# Patient Record
Sex: FEMALE | Race: WHITE
[De-identification: names, ages, dates, MRNs, and addresses within clinical notes are randomized per-mention and may not be internally consistent; named-entity substitution may affect disease eponyms.]

---
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
## 2019-02-11 ENCOUNTER — HOSPITAL ENCOUNTER (INPATIENT)
Age: 19
LOS: 4 days | Discharge: HOME | DRG: 540 | End: 2019-02-15
Payer: MEDICAID

## 2019-02-11 VITALS — BODY MASS INDEX: 33.9 KG/M2

## 2019-02-11 DIAGNOSIS — Z3A.40: ICD-10-CM

## 2019-02-11 DIAGNOSIS — O45.93: ICD-10-CM

## 2019-02-11 DIAGNOSIS — Z87.891: ICD-10-CM

## 2019-02-11 LAB
ALANINE AMINOTRANSFER ALT/SGPT: 18 U/L (ref 13–56)
APTT PPP: 26.6 SECONDS (ref 24.1–36.2)
AST(SGOT): 18 U/L (ref 15–37)
DEPRECATED RDW RBC: 43 FL (ref 35.1–43.9)
ERYTHROCYTE [DISTWIDTH] IN BLOOD: 13 % (ref 11.6–14.6)
EST GLOM FILT RATE - AFR AMER: 148 ML/MIN (ref 60–?)
ESTIMATED CREATININE CLEARANCE: 118.39 ML/MIN
HCT VFR BLD AUTO: 33.5 % (ref 37–47)
HEMOGLOBIN: 11.1 G/DL (ref 12–15)
HGB BLD-MCNC: 11.1 G/DL (ref 12–15)
MCV RBC: 91.5 FL (ref 81–99)
MEAN CORP HGB CONC: 33.1 G/GL (ref 32–36)
MEAN PLATELET VOL.: 10.6 FL (ref 6.2–12)
PLATELET # BLD: 267 K/MM3 (ref 150–450)
PLATELET COUNT: 267 K/MM3 (ref 150–450)
PROTHROMBIN TIME (PROTIME)PT.: 12.2 SECONDS (ref 11.7–14.9)
RBC # BLD AUTO: 3.66 M/MM3 (ref 4.2–5.4)
RBC DISTRIBUTION WIDTH CV: 13 % (ref 11.6–14.6)
RBC DISTRIBUTION WIDTH SD: 43 FL (ref 35.1–43.9)
SCAN INDICATED ON CBC? Y/N: NO
URATE SERPL-MCNC: 4.9 MG/DL (ref 2.6–6)
WBC # BLD AUTO: 9.8 K/MM3 (ref 4.4–11)
WHITE BLOOD COUNT: 9.8 K/MM3 (ref 4.4–11)

## 2019-02-11 PROCEDURE — 59050 FETAL MONITOR W/REPORT: CPT

## 2019-02-11 PROCEDURE — 84450 TRANSFERASE (AST) (SGOT): CPT

## 2019-02-11 PROCEDURE — 84460 ALANINE AMINO (ALT) (SGPT): CPT

## 2019-02-11 PROCEDURE — 59025 FETAL NON-STRESS TEST: CPT

## 2019-02-11 PROCEDURE — 86850 RBC ANTIBODY SCREEN: CPT

## 2019-02-11 PROCEDURE — 88307 TISSUE EXAM BY PATHOLOGIST: CPT

## 2019-02-11 PROCEDURE — 85730 THROMBOPLASTIN TIME PARTIAL: CPT

## 2019-02-11 PROCEDURE — 82565 ASSAY OF CREATININE: CPT

## 2019-02-11 PROCEDURE — 85610 PROTHROMBIN TIME: CPT

## 2019-02-11 PROCEDURE — 86900 BLOOD TYPING SEROLOGIC ABO: CPT

## 2019-02-11 PROCEDURE — G0378 HOSPITAL OBSERVATION PER HR: HCPCS

## 2019-02-11 PROCEDURE — 84550 ASSAY OF BLOOD/URIC ACID: CPT

## 2019-02-11 PROCEDURE — 99218: CPT

## 2019-02-11 PROCEDURE — 85027 COMPLETE CBC AUTOMATED: CPT

## 2019-02-11 PROCEDURE — 36415 COLL VENOUS BLD VENIPUNCTURE: CPT

## 2019-02-11 PROCEDURE — A4216 STERILE WATER/SALINE, 10 ML: HCPCS

## 2019-02-12 VITALS
SYSTOLIC BLOOD PRESSURE: 127 MMHG | HEART RATE: 91 BPM | DIASTOLIC BLOOD PRESSURE: 67 MMHG | RESPIRATION RATE: 17 BRPM | OXYGEN SATURATION: 95 %

## 2019-02-12 VITALS
DIASTOLIC BLOOD PRESSURE: 72 MMHG | HEART RATE: 85 BPM | RESPIRATION RATE: 17 BRPM | SYSTOLIC BLOOD PRESSURE: 121 MMHG | OXYGEN SATURATION: 95 %

## 2019-02-12 VITALS
HEART RATE: 93 BPM | OXYGEN SATURATION: 95 % | RESPIRATION RATE: 16 BRPM | DIASTOLIC BLOOD PRESSURE: 72 MMHG | SYSTOLIC BLOOD PRESSURE: 92 MMHG

## 2019-02-12 VITALS
RESPIRATION RATE: 16 BRPM | DIASTOLIC BLOOD PRESSURE: 75 MMHG | TEMPERATURE: 98 F | SYSTOLIC BLOOD PRESSURE: 134 MMHG | OXYGEN SATURATION: 99 % | HEART RATE: 104 BPM

## 2019-02-12 VITALS
HEART RATE: 104 BPM | DIASTOLIC BLOOD PRESSURE: 81 MMHG | SYSTOLIC BLOOD PRESSURE: 126 MMHG | OXYGEN SATURATION: 99 % | RESPIRATION RATE: 15 BRPM

## 2019-02-12 VITALS
DIASTOLIC BLOOD PRESSURE: 72 MMHG | SYSTOLIC BLOOD PRESSURE: 112 MMHG | HEART RATE: 94 BPM | RESPIRATION RATE: 14 BRPM | OXYGEN SATURATION: 96 %

## 2019-02-12 VITALS — OXYGEN SATURATION: 96 % | RESPIRATION RATE: 18 BRPM | HEART RATE: 109 BPM

## 2019-02-12 VITALS
TEMPERATURE: 96.98 F | SYSTOLIC BLOOD PRESSURE: 107 MMHG | RESPIRATION RATE: 16 BRPM | HEART RATE: 97 BPM | DIASTOLIC BLOOD PRESSURE: 57 MMHG | OXYGEN SATURATION: 96 %

## 2019-02-12 VITALS
RESPIRATION RATE: 18 BRPM | DIASTOLIC BLOOD PRESSURE: 95 MMHG | OXYGEN SATURATION: 94 % | TEMPERATURE: 98.6 F | HEART RATE: 92 BPM | SYSTOLIC BLOOD PRESSURE: 125 MMHG

## 2019-02-12 VITALS
DIASTOLIC BLOOD PRESSURE: 72 MMHG | RESPIRATION RATE: 15 BRPM | OXYGEN SATURATION: 97 % | SYSTOLIC BLOOD PRESSURE: 127 MMHG | TEMPERATURE: 97.2 F | HEART RATE: 88 BPM

## 2019-02-12 VITALS
OXYGEN SATURATION: 95 % | HEART RATE: 87 BPM | RESPIRATION RATE: 15 BRPM | SYSTOLIC BLOOD PRESSURE: 127 MMHG | DIASTOLIC BLOOD PRESSURE: 70 MMHG

## 2019-02-12 VITALS
TEMPERATURE: 96.98 F | HEART RATE: 98 BPM | RESPIRATION RATE: 18 BRPM | SYSTOLIC BLOOD PRESSURE: 121 MMHG | DIASTOLIC BLOOD PRESSURE: 68 MMHG | OXYGEN SATURATION: 100 %

## 2019-02-12 VITALS
RESPIRATION RATE: 18 BRPM | DIASTOLIC BLOOD PRESSURE: 47 MMHG | SYSTOLIC BLOOD PRESSURE: 127 MMHG | OXYGEN SATURATION: 96 % | HEART RATE: 95 BPM

## 2019-02-12 VITALS — RESPIRATION RATE: 18 BRPM | HEART RATE: 93 BPM | OXYGEN SATURATION: 96 %

## 2019-02-12 VITALS — OXYGEN SATURATION: 96 % | RESPIRATION RATE: 18 BRPM | HEART RATE: 102 BPM

## 2019-02-12 VITALS
RESPIRATION RATE: 19 BRPM | TEMPERATURE: 97.6 F | HEART RATE: 94 BPM | OXYGEN SATURATION: 96 % | DIASTOLIC BLOOD PRESSURE: 72 MMHG | SYSTOLIC BLOOD PRESSURE: 127 MMHG

## 2019-02-13 VITALS
SYSTOLIC BLOOD PRESSURE: 107 MMHG | HEART RATE: 107 BPM | TEMPERATURE: 97.7 F | DIASTOLIC BLOOD PRESSURE: 50 MMHG | OXYGEN SATURATION: 97 % | RESPIRATION RATE: 16 BRPM

## 2019-02-13 VITALS
SYSTOLIC BLOOD PRESSURE: 126 MMHG | DIASTOLIC BLOOD PRESSURE: 58 MMHG | HEART RATE: 109 BPM | TEMPERATURE: 98.6 F | RESPIRATION RATE: 18 BRPM

## 2019-02-13 VITALS
OXYGEN SATURATION: 98 % | TEMPERATURE: 97.88 F | RESPIRATION RATE: 16 BRPM | SYSTOLIC BLOOD PRESSURE: 124 MMHG | DIASTOLIC BLOOD PRESSURE: 73 MMHG | HEART RATE: 101 BPM

## 2019-02-13 VITALS
RESPIRATION RATE: 16 BRPM | OXYGEN SATURATION: 98 % | SYSTOLIC BLOOD PRESSURE: 124 MMHG | HEART RATE: 88 BPM | TEMPERATURE: 98.24 F | DIASTOLIC BLOOD PRESSURE: 60 MMHG

## 2019-02-13 VITALS — OXYGEN SATURATION: 96 % | HEART RATE: 102 BPM | RESPIRATION RATE: 18 BRPM

## 2019-02-13 VITALS — HEART RATE: 102 BPM | OXYGEN SATURATION: 99 % | RESPIRATION RATE: 17 BRPM

## 2019-02-13 VITALS — HEART RATE: 103 BPM | RESPIRATION RATE: 16 BRPM | OXYGEN SATURATION: 97 %

## 2019-02-13 VITALS — RESPIRATION RATE: 16 BRPM | OXYGEN SATURATION: 98 % | HEART RATE: 93 BPM

## 2019-02-13 VITALS — HEART RATE: 87 BPM | OXYGEN SATURATION: 96 % | RESPIRATION RATE: 16 BRPM

## 2019-02-13 VITALS
OXYGEN SATURATION: 98 % | TEMPERATURE: 97.88 F | DIASTOLIC BLOOD PRESSURE: 77 MMHG | SYSTOLIC BLOOD PRESSURE: 121 MMHG | HEART RATE: 101 BPM | RESPIRATION RATE: 18 BRPM

## 2019-02-13 LAB
DEPRECATED RDW RBC: 45.5 FL (ref 35.1–43.9)
ERYTHROCYTE [DISTWIDTH] IN BLOOD: 13.4 % (ref 11.6–14.6)
HCT VFR BLD AUTO: 29.3 % (ref 37–47)
HEMOGLOBIN: 9.4 G/DL (ref 12–15)
HGB BLD-MCNC: 9.4 G/DL (ref 12–15)
MCV RBC: 93.3 FL (ref 81–99)
MEAN CORP HGB CONC: 32.1 G/GL (ref 32–36)
MEAN PLATELET VOL.: 10.1 FL (ref 6.2–12)
PLATELET # BLD: 207 K/MM3 (ref 150–450)
PLATELET COUNT: 207 K/MM3 (ref 150–450)
RBC # BLD AUTO: 3.14 M/MM3 (ref 4.2–5.4)
RBC DISTRIBUTION WIDTH CV: 13.4 % (ref 11.6–14.6)
RBC DISTRIBUTION WIDTH SD: 45.5 FL (ref 35.1–43.9)
SCAN INDICATED ON CBC? Y/N: NO
WBC # BLD AUTO: 13.4 K/MM3 (ref 4.4–11)
WHITE BLOOD COUNT: 13.4 K/MM3 (ref 4.4–11)

## 2019-02-14 VITALS
SYSTOLIC BLOOD PRESSURE: 131 MMHG | HEART RATE: 110 BPM | TEMPERATURE: 97.8 F | RESPIRATION RATE: 16 BRPM | DIASTOLIC BLOOD PRESSURE: 70 MMHG

## 2019-02-14 VITALS
DIASTOLIC BLOOD PRESSURE: 76 MMHG | SYSTOLIC BLOOD PRESSURE: 134 MMHG | TEMPERATURE: 98.5 F | HEART RATE: 94 BPM | OXYGEN SATURATION: 98 % | RESPIRATION RATE: 16 BRPM

## 2019-02-14 VITALS
TEMPERATURE: 97.88 F | SYSTOLIC BLOOD PRESSURE: 131 MMHG | HEART RATE: 85 BPM | DIASTOLIC BLOOD PRESSURE: 78 MMHG | RESPIRATION RATE: 14 BRPM

## 2019-02-14 VITALS
DIASTOLIC BLOOD PRESSURE: 60 MMHG | RESPIRATION RATE: 16 BRPM | TEMPERATURE: 98.3 F | SYSTOLIC BLOOD PRESSURE: 125 MMHG | HEART RATE: 91 BPM

## 2019-02-15 VITALS
DIASTOLIC BLOOD PRESSURE: 59 MMHG | SYSTOLIC BLOOD PRESSURE: 104 MMHG | OXYGEN SATURATION: 97 % | HEART RATE: 73 BPM | TEMPERATURE: 98.1 F | RESPIRATION RATE: 18 BRPM

## 2019-02-15 VITALS
TEMPERATURE: 97.88 F | OXYGEN SATURATION: 98 % | DIASTOLIC BLOOD PRESSURE: 80 MMHG | SYSTOLIC BLOOD PRESSURE: 136 MMHG | RESPIRATION RATE: 16 BRPM | HEART RATE: 82 BPM

## 2019-02-15 LAB — PATHOLOGY SPECIMEN OB: (no result)

## 2019-08-13 ENCOUNTER — HOSPITAL ENCOUNTER (OUTPATIENT)
Dept: HOSPITAL 100 - LABSPEC | Age: 19
Discharge: HOME | End: 2019-08-13
Payer: MEDICAID

## 2019-08-13 VITALS — BODY MASS INDEX: 33.9 KG/M2

## 2019-08-13 DIAGNOSIS — N39.0: Primary | ICD-10-CM

## 2019-08-13 PROCEDURE — 87086 URINE CULTURE/COLONY COUNT: CPT

## 2019-08-13 PROCEDURE — 87186 SC STD MICRODIL/AGAR DIL: CPT

## 2019-08-13 PROCEDURE — 87077 CULTURE AEROBIC IDENTIFY: CPT

## 2019-08-13 PROCEDURE — 87088 URINE BACTERIA CULTURE: CPT

## 2020-09-03 ENCOUNTER — HOSPITAL ENCOUNTER (OUTPATIENT)
Dept: HOSPITAL 100 - LABSPEC | Age: 20
Discharge: HOME | End: 2020-09-03
Payer: MEDICAID

## 2020-09-03 VITALS — BODY MASS INDEX: 33.9 KG/M2

## 2020-09-03 DIAGNOSIS — Z11.3: Primary | ICD-10-CM

## 2020-09-03 PROCEDURE — 87491 CHLMYD TRACH DNA AMP PROBE: CPT

## 2020-09-03 PROCEDURE — 87591 N.GONORRHOEAE DNA AMP PROB: CPT

## 2020-09-16 ENCOUNTER — HOSPITAL ENCOUNTER (OUTPATIENT)
Age: 20
End: 2020-09-16
Payer: MEDICAID

## 2020-09-16 VITALS — BODY MASS INDEX: 33.9 KG/M2

## 2020-09-16 DIAGNOSIS — Z34.81: Primary | ICD-10-CM

## 2020-09-16 LAB
DEPRECATED RDW RBC: 38.3 FL (ref 35.1–43.9)
ERYTHROCYTE [DISTWIDTH] IN BLOOD: 11.9 % (ref 11.6–14.6)
HCT VFR BLD AUTO: 37.9 % (ref 37–47)
HEMOGLOBIN: 13.1 G/DL (ref 12–15)
HGB BLD-MCNC: 13.1 G/DL (ref 12–15)
IMMATURE GRANULOCYTES COUNT: 0.04 X10^3/UL (ref 0–0)
LEUKOCYTE ESTERASE UR QL STRIP: 100 /UL
MCV RBC: 88.8 FL (ref 81–99)
MEAN CORP HGB CONC: 34.6 G/DL (ref 32–36)
MEAN PLATELET VOL.: 10.1 FL (ref 6.2–12)
NRBC FLAGGED BY ANALYZER: 0 % (ref 0–5)
PLATELET # BLD: 306 K/MM3 (ref 150–450)
PLATELET COUNT: 306 K/MM3 (ref 150–450)
RBC # BLD AUTO: 4.27 M/MM3 (ref 4.2–5.4)
RBC DISTRIBUTION WIDTH CV: 11.9 % (ref 11.6–14.6)
RBC DISTRIBUTION WIDTH SD: 38.3 FL (ref 35.1–43.9)
SP GR UR: 1.01 (ref 1–1.03)
URINE PRESERVATIVE: (no result)
WBC # BLD AUTO: 11.9 K/MM3 (ref 4.4–11)
WHITE BLOOD COUNT: 11.9 K/MM3 (ref 4.4–11)

## 2020-09-16 PROCEDURE — 85025 COMPLETE CBC W/AUTO DIFF WBC: CPT

## 2020-09-16 PROCEDURE — 84443 ASSAY THYROID STIM HORMONE: CPT

## 2020-09-16 PROCEDURE — 86703 HIV-1/HIV-2 1 RESULT ANTBDY: CPT

## 2020-09-16 PROCEDURE — 86803 HEPATITIS C AB TEST: CPT

## 2020-09-16 PROCEDURE — 86762 RUBELLA ANTIBODY: CPT

## 2020-09-16 PROCEDURE — 36415 COLL VENOUS BLD VENIPUNCTURE: CPT

## 2020-09-16 PROCEDURE — 81002 URINALYSIS NONAUTO W/O SCOPE: CPT

## 2020-09-16 PROCEDURE — 87340 HEPATITIS B SURFACE AG IA: CPT

## 2020-09-17 LAB — PRENATAL RPR: NONREACTIVE

## 2021-01-05 ENCOUNTER — HOSPITAL ENCOUNTER (OUTPATIENT)
Age: 21
End: 2021-01-05
Payer: MEDICAID

## 2021-01-05 VITALS — BODY MASS INDEX: 33.9 KG/M2

## 2021-01-05 DIAGNOSIS — Z34.82: Primary | ICD-10-CM

## 2021-01-05 LAB
DEPRECATED RDW RBC: 41.1 FL (ref 35.1–43.9)
ERYTHROCYTE [DISTWIDTH] IN BLOOD: 12.5 % (ref 11.6–14.6)
GLUCOSE 1H P 50 G GLC PO SERPL-MCNC: 96 MG/DL (ref 70–140)
HCT VFR BLD AUTO: 33 % (ref 37–47)
HEMOGLOBIN: 11.1 G/DL (ref 12–15)
HGB BLD-MCNC: 11.1 G/DL (ref 12–15)
MCV RBC: 91.2 FL (ref 81–99)
MEAN CORP HGB CONC: 33.6 G/DL (ref 32–36)
MEAN PLATELET VOL.: 9.9 FL (ref 6.2–12)
PLATELET # BLD: 301 K/MM3 (ref 150–450)
PLATELET COUNT: 301 K/MM3 (ref 150–450)
RBC # BLD AUTO: 3.62 M/MM3 (ref 4.2–5.4)
RBC DISTRIBUTION WIDTH CV: 12.5 % (ref 11.6–14.6)
RBC DISTRIBUTION WIDTH SD: 41.1 FL (ref 35.1–43.9)
WBC # BLD AUTO: 10.2 K/MM3 (ref 4.4–11)
WHITE BLOOD COUNT: 10.2 K/MM3 (ref 4.4–11)

## 2021-01-05 PROCEDURE — 82950 GLUCOSE TEST: CPT

## 2021-01-05 PROCEDURE — 85027 COMPLETE CBC AUTOMATED: CPT

## 2021-01-05 PROCEDURE — 36415 COLL VENOUS BLD VENIPUNCTURE: CPT

## 2021-03-30 ENCOUNTER — HOSPITAL ENCOUNTER (OUTPATIENT)
Age: 21
Discharge: HOME | End: 2021-03-30
Payer: MEDICAID

## 2021-03-30 VITALS — BODY MASS INDEX: 33.9 KG/M2

## 2021-03-30 DIAGNOSIS — Z36.85: Primary | ICD-10-CM

## 2021-03-30 PROCEDURE — 87081 CULTURE SCREEN ONLY: CPT

## 2021-04-21 ENCOUNTER — HOSPITAL ENCOUNTER (OUTPATIENT)
Dept: HOSPITAL 100 - LABSPEC | Age: 21
Discharge: HOME | End: 2021-04-21
Payer: MEDICAID

## 2021-04-21 VITALS — BODY MASS INDEX: 33.9 KG/M2

## 2021-04-21 DIAGNOSIS — Z03.818: Primary | ICD-10-CM

## 2021-04-21 PROCEDURE — C9803 HOPD COVID-19 SPEC COLLECT: HCPCS

## 2021-04-21 PROCEDURE — 87635 SARS-COV-2 COVID-19 AMP PRB: CPT

## 2021-04-21 PROCEDURE — U0002 COVID-19 LAB TEST NON-CDC: HCPCS

## 2021-04-22 ENCOUNTER — HOSPITAL ENCOUNTER (INPATIENT)
Age: 21
LOS: 1 days | Discharge: HOME | DRG: 540 | End: 2021-04-23
Payer: MEDICAID

## 2021-04-22 VITALS
DIASTOLIC BLOOD PRESSURE: 60 MMHG | RESPIRATION RATE: 15 BRPM | OXYGEN SATURATION: 99 % | TEMPERATURE: 97.8 F | HEART RATE: 89 BPM | SYSTOLIC BLOOD PRESSURE: 120 MMHG

## 2021-04-22 VITALS
HEART RATE: 67 BPM | TEMPERATURE: 98.06 F | OXYGEN SATURATION: 100 % | SYSTOLIC BLOOD PRESSURE: 126 MMHG | RESPIRATION RATE: 15 BRPM | DIASTOLIC BLOOD PRESSURE: 80 MMHG

## 2021-04-22 VITALS — RESPIRATION RATE: 16 BRPM | HEART RATE: 83 BPM | OXYGEN SATURATION: 100 %

## 2021-04-22 VITALS
TEMPERATURE: 96.62 F | RESPIRATION RATE: 17 BRPM | HEART RATE: 85 BPM | SYSTOLIC BLOOD PRESSURE: 108 MMHG | DIASTOLIC BLOOD PRESSURE: 63 MMHG | OXYGEN SATURATION: 97 %

## 2021-04-22 VITALS
OXYGEN SATURATION: 99 % | RESPIRATION RATE: 15 BRPM | TEMPERATURE: 97.16 F | DIASTOLIC BLOOD PRESSURE: 80 MMHG | HEART RATE: 82 BPM | SYSTOLIC BLOOD PRESSURE: 126 MMHG

## 2021-04-22 VITALS
HEART RATE: 74 BPM | SYSTOLIC BLOOD PRESSURE: 107 MMHG | DIASTOLIC BLOOD PRESSURE: 63 MMHG | OXYGEN SATURATION: 98 % | RESPIRATION RATE: 15 BRPM | TEMPERATURE: 97.34 F

## 2021-04-22 VITALS
OXYGEN SATURATION: 96 % | DIASTOLIC BLOOD PRESSURE: 80 MMHG | HEART RATE: 102 BPM | TEMPERATURE: 97.34 F | RESPIRATION RATE: 16 BRPM | SYSTOLIC BLOOD PRESSURE: 126 MMHG

## 2021-04-22 VITALS
HEART RATE: 87 BPM | DIASTOLIC BLOOD PRESSURE: 80 MMHG | RESPIRATION RATE: 15 BRPM | SYSTOLIC BLOOD PRESSURE: 126 MMHG | TEMPERATURE: 96.44 F | OXYGEN SATURATION: 100 %

## 2021-04-22 VITALS
SYSTOLIC BLOOD PRESSURE: 116 MMHG | DIASTOLIC BLOOD PRESSURE: 63 MMHG | HEART RATE: 83 BPM | RESPIRATION RATE: 16 BRPM | OXYGEN SATURATION: 100 % | TEMPERATURE: 96.9 F

## 2021-04-22 VITALS
OXYGEN SATURATION: 99 % | DIASTOLIC BLOOD PRESSURE: 76 MMHG | HEART RATE: 73 BPM | RESPIRATION RATE: 14 BRPM | SYSTOLIC BLOOD PRESSURE: 115 MMHG

## 2021-04-22 VITALS
DIASTOLIC BLOOD PRESSURE: 70 MMHG | OXYGEN SATURATION: 100 % | SYSTOLIC BLOOD PRESSURE: 126 MMHG | RESPIRATION RATE: 16 BRPM | HEART RATE: 71 BPM

## 2021-04-22 VITALS
HEART RATE: 73 BPM | RESPIRATION RATE: 18 BRPM | OXYGEN SATURATION: 97 % | DIASTOLIC BLOOD PRESSURE: 44 MMHG | TEMPERATURE: 97.4 F | SYSTOLIC BLOOD PRESSURE: 105 MMHG

## 2021-04-22 VITALS
SYSTOLIC BLOOD PRESSURE: 117 MMHG | RESPIRATION RATE: 18 BRPM | HEART RATE: 80 BPM | DIASTOLIC BLOOD PRESSURE: 70 MMHG | OXYGEN SATURATION: 100 % | TEMPERATURE: 97 F

## 2021-04-22 VITALS — HEART RATE: 80 BPM | RESPIRATION RATE: 18 BRPM | OXYGEN SATURATION: 100 %

## 2021-04-22 VITALS
HEART RATE: 70 BPM | SYSTOLIC BLOOD PRESSURE: 126 MMHG | DIASTOLIC BLOOD PRESSURE: 71 MMHG | OXYGEN SATURATION: 99 % | RESPIRATION RATE: 14 BRPM | TEMPERATURE: 98.06 F

## 2021-04-22 VITALS
DIASTOLIC BLOOD PRESSURE: 51 MMHG | SYSTOLIC BLOOD PRESSURE: 112 MMHG | RESPIRATION RATE: 15 BRPM | TEMPERATURE: 97.52 F | OXYGEN SATURATION: 100 % | HEART RATE: 85 BPM

## 2021-04-22 VITALS
TEMPERATURE: 97.88 F | OXYGEN SATURATION: 100 % | DIASTOLIC BLOOD PRESSURE: 58 MMHG | SYSTOLIC BLOOD PRESSURE: 108 MMHG | RESPIRATION RATE: 16 BRPM | HEART RATE: 74 BPM

## 2021-04-22 VITALS — RESPIRATION RATE: 16 BRPM | HEART RATE: 73 BPM | OXYGEN SATURATION: 100 %

## 2021-04-22 VITALS — BODY MASS INDEX: 33 KG/M2 | BODY MASS INDEX: 33.9 KG/M2

## 2021-04-22 DIAGNOSIS — Z3A.40: ICD-10-CM

## 2021-04-22 DIAGNOSIS — O41.03X0: ICD-10-CM

## 2021-04-22 DIAGNOSIS — Z87.891: ICD-10-CM

## 2021-04-22 DIAGNOSIS — Z30.430: ICD-10-CM

## 2021-04-22 DIAGNOSIS — Z03.818: ICD-10-CM

## 2021-04-22 DIAGNOSIS — O34.211: Primary | ICD-10-CM

## 2021-04-22 LAB
DEPRECATED RDW RBC: 44.1 FL (ref 35.1–43.9)
ERYTHROCYTE [DISTWIDTH] IN BLOOD: 14.2 % (ref 11.6–14.6)
HCT VFR BLD AUTO: 31.8 % (ref 37–47)
HEMOGLOBIN: 10.1 G/DL (ref 12–15)
HGB BLD-MCNC: 10.1 G/DL (ref 12–15)
IMMATURE GRANULOCYTES COUNT: 0.03 X10^3/UL (ref 0–0)
MCV RBC: 86.6 FL (ref 81–99)
MEAN CORP HGB CONC: 31.8 G/DL (ref 32–36)
MEAN PLATELET VOL.: 11.4 FL (ref 6.2–12)
NRBC FLAGGED BY ANALYZER: 0 % (ref 0–5)
PLATELET # BLD: 300 K/MM3 (ref 150–450)
PLATELET COUNT: 300 K/MM3 (ref 150–450)
RBC # BLD AUTO: 3.67 M/MM3 (ref 4.2–5.4)
RBC DISTRIBUTION WIDTH CV: 14.2 % (ref 11.6–14.6)
RBC DISTRIBUTION WIDTH SD: 44.1 FL (ref 35.1–43.9)
WBC # BLD AUTO: 9.8 K/MM3 (ref 4.4–11)
WHITE BLOOD COUNT: 9.8 K/MM3 (ref 4.4–11)

## 2021-04-22 PROCEDURE — C9803 HOPD COVID-19 SPEC COLLECT: HCPCS

## 2021-04-22 PROCEDURE — 86901 BLOOD TYPING SEROLOGIC RH(D): CPT

## 2021-04-22 PROCEDURE — G0378 HOSPITAL OBSERVATION PER HR: HCPCS

## 2021-04-22 PROCEDURE — U0002 COVID-19 LAB TEST NON-CDC: HCPCS

## 2021-04-22 PROCEDURE — 85027 COMPLETE CBC AUTOMATED: CPT

## 2021-04-22 PROCEDURE — 86850 RBC ANTIBODY SCREEN: CPT

## 2021-04-22 PROCEDURE — A4216 STERILE WATER/SALINE, 10 ML: HCPCS

## 2021-04-22 PROCEDURE — 86900 BLOOD TYPING SEROLOGIC ABO: CPT

## 2021-04-22 PROCEDURE — 87635 SARS-COV-2 COVID-19 AMP PRB: CPT

## 2021-04-22 PROCEDURE — 85025 COMPLETE CBC W/AUTO DIFF WBC: CPT

## 2021-04-22 PROCEDURE — 99218: CPT

## 2021-04-23 VITALS
HEART RATE: 79 BPM | RESPIRATION RATE: 18 BRPM | OXYGEN SATURATION: 99 % | DIASTOLIC BLOOD PRESSURE: 62 MMHG | TEMPERATURE: 98.7 F | SYSTOLIC BLOOD PRESSURE: 110 MMHG

## 2021-04-23 VITALS
HEART RATE: 76 BPM | OXYGEN SATURATION: 98 % | DIASTOLIC BLOOD PRESSURE: 47 MMHG | RESPIRATION RATE: 17 BRPM | SYSTOLIC BLOOD PRESSURE: 103 MMHG | TEMPERATURE: 98.5 F

## 2021-04-23 VITALS
TEMPERATURE: 98.42 F | SYSTOLIC BLOOD PRESSURE: 109 MMHG | RESPIRATION RATE: 16 BRPM | OXYGEN SATURATION: 98 % | DIASTOLIC BLOOD PRESSURE: 49 MMHG | HEART RATE: 70 BPM

## 2021-04-23 LAB
DEPRECATED RDW RBC: 45.4 FL (ref 35.1–43.9)
ERYTHROCYTE [DISTWIDTH] IN BLOOD: 14.3 % (ref 11.6–14.6)
HCT VFR BLD AUTO: 28.6 % (ref 37–47)
HEMOGLOBIN: 8.8 G/DL (ref 12–15)
HGB BLD-MCNC: 8.8 G/DL (ref 12–15)
MCV RBC: 88.3 FL (ref 81–99)
MEAN CORP HGB CONC: 30.8 G/DL (ref 32–36)
MEAN PLATELET VOL.: 10.5 FL (ref 6.2–12)
PLATELET # BLD: 209 K/MM3 (ref 150–450)
PLATELET COUNT: 209 K/MM3 (ref 150–450)
RBC # BLD AUTO: 3.24 M/MM3 (ref 4.2–5.4)
RBC DISTRIBUTION WIDTH CV: 14.3 % (ref 11.6–14.6)
RBC DISTRIBUTION WIDTH SD: 45.4 FL (ref 35.1–43.9)
WBC # BLD AUTO: 9.9 K/MM3 (ref 4.4–11)
WHITE BLOOD COUNT: 9.9 K/MM3 (ref 4.4–11)

## 2021-06-09 ENCOUNTER — HOSPITAL ENCOUNTER (EMERGENCY)
Age: 21
Discharge: HOME | End: 2021-06-09
Payer: MEDICAID

## 2021-06-09 VITALS — BODY MASS INDEX: 29.9 KG/M2 | BODY MASS INDEX: 33 KG/M2

## 2021-06-09 VITALS
RESPIRATION RATE: 16 BRPM | TEMPERATURE: 99.86 F | HEART RATE: 150 BPM | DIASTOLIC BLOOD PRESSURE: 66 MMHG | SYSTOLIC BLOOD PRESSURE: 126 MMHG | OXYGEN SATURATION: 98 %

## 2021-06-09 VITALS
SYSTOLIC BLOOD PRESSURE: 132 MMHG | DIASTOLIC BLOOD PRESSURE: 71 MMHG | RESPIRATION RATE: 18 BRPM | OXYGEN SATURATION: 99 % | HEART RATE: 104 BPM

## 2021-06-09 DIAGNOSIS — R00.0: ICD-10-CM

## 2021-06-09 DIAGNOSIS — Z79.3: ICD-10-CM
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
## 2021-06-09 DIAGNOSIS — R50.9: ICD-10-CM

## 2021-06-09 DIAGNOSIS — T37.0X5A: ICD-10-CM

## 2021-06-09 DIAGNOSIS — L29.8: ICD-10-CM

## 2021-06-09 LAB
LEUKOCYTE ESTERASE UR QL STRIP: 25 /UL
MUCOUS THREADS URNS QL MICRO: (no result) /HPF
PROT UR QL STRIP.AUTO: 15 MG/DL
RBC UR QL: (no result) /HPF (ref 0–5)
RBC UR QL: 10 /UL
SP GR UR: 1.02 (ref 1–1.03)
SQUAMOUS URNS QL MICRO: (no result) /HPF (ref 5–10)
URINE PRESERVATIVE: (no result)

## 2021-06-09 PROCEDURE — 99283 EMERGENCY DEPT VISIT LOW MDM: CPT

## 2021-06-09 PROCEDURE — 81001 URINALYSIS AUTO W/SCOPE: CPT

## 2022-01-11 ENCOUNTER — HOSPITAL ENCOUNTER (OUTPATIENT)
Dept: HOSPITAL 100 - LABSPEC | Age: 22
Discharge: ANOTHER HOSPITAL | End: 2022-01-11
Payer: MEDICAID

## 2022-01-11 DIAGNOSIS — Z12.4: Primary | ICD-10-CM

## 2022-01-11 PROCEDURE — G0145 SCR C/V CYTO,THINLAYER,RESCR: HCPCS

## 2022-01-11 PROCEDURE — 88175 CYTOPATH C/V AUTO FLUID REDO: CPT

## 2023-09-26 ENCOUNTER — OFFICE VISIT (OUTPATIENT)
Dept: PRIMARY CARE | Facility: CLINIC | Age: 23
End: 2023-09-26
Payer: MEDICAID

## 2023-09-26 VITALS
HEART RATE: 96 BPM | SYSTOLIC BLOOD PRESSURE: 130 MMHG | WEIGHT: 183 LBS | HEIGHT: 66 IN | DIASTOLIC BLOOD PRESSURE: 80 MMHG | BODY MASS INDEX: 29.41 KG/M2

## 2023-09-26 DIAGNOSIS — F41.9 ANXIETY: Primary | ICD-10-CM

## 2023-09-26 PROCEDURE — 1036F TOBACCO NON-USER: CPT

## 2023-09-26 PROCEDURE — 99203 OFFICE O/P NEW LOW 30 MIN: CPT

## 2023-09-26 RX ORDER — HYDROXYZINE HYDROCHLORIDE 10 MG/1
10 TABLET, FILM COATED ORAL 4 TIMES DAILY
Qty: 60 TABLET | Refills: 0 | Status: SHIPPED | OUTPATIENT
Start: 2023-09-26 | End: 2023-10-12

## 2023-09-26 RX ORDER — SERTRALINE HYDROCHLORIDE 50 MG/1
50 TABLET, FILM COATED ORAL DAILY
Qty: 30 TABLET | Refills: 1 | Status: SHIPPED | OUTPATIENT
Start: 2023-09-26 | End: 2023-11-25

## 2023-09-26 ASSESSMENT — ENCOUNTER SYMPTOMS
CARDIOVASCULAR NEGATIVE: 1
RESPIRATORY NEGATIVE: 1
GASTROINTESTINAL NEGATIVE: 1
CONSTITUTIONAL NEGATIVE: 1

## 2023-09-26 ASSESSMENT — PATIENT HEALTH QUESTIONNAIRE - PHQ9
1. LITTLE INTEREST OR PLEASURE IN DOING THINGS: NOT AT ALL
2. FEELING DOWN, DEPRESSED OR HOPELESS: NOT AT ALL
SUM OF ALL RESPONSES TO PHQ9 QUESTIONS 1 AND 2: 0

## 2023-09-26 NOTE — PROGRESS NOTES
"Subjective   Patient ID: Grace Zelaya is a 22 y.o. female who presents for new pt would like to discuss anxiety medication.    HPI   ANXIETY/DEPRESSION: Zoloft while breastfeeding was helpful, 75mg was effective but not completely, stopped about 9 months ago. JUAN 20 today, PHQ 5 today. Two children at home,  is , very busy/stressful. Appetite is good. Sleep is not consistent, when she does have trouble sleeping, difficulty falling and staying asleep. Has not tried anything for sleep yet. She endorses some possible mild panic attacks as well once weekly.     Her previous GYN closed recently, she will reach out to [facility] soon.     Review of Systems   Constitutional: Negative.    Respiratory: Negative.     Cardiovascular: Negative.    Gastrointestinal: Negative.        Objective   /80   Pulse 96   Ht 1.676 m (5' 6\")   Wt 83 kg (183 lb)   BMI 29.54 kg/m²     Physical Exam  Constitutional:       General: She is not in acute distress.     Appearance: Normal appearance. She is not ill-appearing.   HENT:      Head: Normocephalic and atraumatic.   Eyes:      Extraocular Movements: Extraocular movements intact.      Conjunctiva/sclera: Conjunctivae normal.   Cardiovascular:      Rate and Rhythm: Normal rate.   Pulmonary:      Effort: Pulmonary effort is normal.   Abdominal:      General: There is no distension.   Musculoskeletal:         General: Normal range of motion.      Cervical back: Normal range of motion.   Skin:     General: Skin is warm and dry.   Neurological:      General: No focal deficit present.      Mental Status: She is alert and oriented to person, place, and time.   Psychiatric:         Mood and Affect: Mood normal.         Behavior: Behavior normal.         Thought Content: Thought content normal.         Judgment: Judgment normal.         Assessment/Plan        Start sertraline 50mg daily, hydroxyzine prn anxiety/sleep.  Follow up 1 month or sooner " prn.

## 2023-10-18 DIAGNOSIS — F41.9 ANXIETY: ICD-10-CM

## 2023-10-19 RX ORDER — SERTRALINE HYDROCHLORIDE 50 MG/1
50 TABLET, FILM COATED ORAL DAILY
Qty: 90 TABLET | Refills: 1 | OUTPATIENT
Start: 2023-10-19

## 2023-10-30 ENCOUNTER — APPOINTMENT (OUTPATIENT)
Dept: PRIMARY CARE | Facility: CLINIC | Age: 23
End: 2023-10-30
Payer: MEDICAID

## 2023-11-18 DIAGNOSIS — F41.9 ANXIETY: ICD-10-CM

## 2023-11-20 RX ORDER — SERTRALINE HYDROCHLORIDE 50 MG/1
50 TABLET, FILM COATED ORAL DAILY
Qty: 90 TABLET | Refills: 1 | OUTPATIENT
Start: 2023-11-20